# Patient Record
Sex: MALE | Race: WHITE | Employment: FULL TIME | URBAN - NONMETROPOLITAN AREA
[De-identification: names, ages, dates, MRNs, and addresses within clinical notes are randomized per-mention and may not be internally consistent; named-entity substitution may affect disease eponyms.]

---

## 2022-08-15 ENCOUNTER — OFFICE VISIT (OUTPATIENT)
Dept: OPTOMETRY | Age: 27
End: 2022-08-15
Payer: COMMERCIAL

## 2022-08-15 ENCOUNTER — OFFICE VISIT (OUTPATIENT)
Dept: PRIMARY CARE CLINIC | Age: 27
End: 2022-08-15
Payer: COMMERCIAL

## 2022-08-15 VITALS
HEART RATE: 71 BPM | WEIGHT: 202 LBS | SYSTOLIC BLOOD PRESSURE: 126 MMHG | OXYGEN SATURATION: 97 % | DIASTOLIC BLOOD PRESSURE: 88 MMHG | BODY MASS INDEX: 25.93 KG/M2 | TEMPERATURE: 97.4 F | HEIGHT: 74 IN | RESPIRATION RATE: 18 BRPM

## 2022-08-15 DIAGNOSIS — S05.01XA ABRASION OF RIGHT CORNEA, INITIAL ENCOUNTER: Primary | ICD-10-CM

## 2022-08-15 PROCEDURE — 99213 OFFICE O/P EST LOW 20 MIN: CPT | Performed by: NURSE PRACTITIONER

## 2022-08-15 PROCEDURE — 92071 CONTACT LENS FITTING FOR TX: CPT | Performed by: OPTOMETRIST

## 2022-08-15 PROCEDURE — 99204 OFFICE O/P NEW MOD 45 MIN: CPT | Performed by: OPTOMETRIST

## 2022-08-15 RX ORDER — TOBRAMYCIN 3 MG/ML
1 SOLUTION/ DROPS OPHTHALMIC
Qty: 1 EACH | Refills: 0 | Status: SHIPPED | OUTPATIENT
Start: 2022-08-15 | End: 2022-08-22

## 2022-08-15 SDOH — ECONOMIC STABILITY: FOOD INSECURITY: WITHIN THE PAST 12 MONTHS, YOU WORRIED THAT YOUR FOOD WOULD RUN OUT BEFORE YOU GOT MONEY TO BUY MORE.: NEVER TRUE

## 2022-08-15 SDOH — ECONOMIC STABILITY: FOOD INSECURITY: WITHIN THE PAST 12 MONTHS, THE FOOD YOU BOUGHT JUST DIDN'T LAST AND YOU DIDN'T HAVE MONEY TO GET MORE.: NEVER TRUE

## 2022-08-15 ASSESSMENT — VISUAL ACUITY
METHOD: SNELLEN - LINEAR
OS_CC: 20/40
CORRECTION_TYPE: GLASSES
OU: 1

## 2022-08-15 ASSESSMENT — PATIENT HEALTH QUESTIONNAIRE - PHQ9
SUM OF ALL RESPONSES TO PHQ QUESTIONS 1-9: 0
SUM OF ALL RESPONSES TO PHQ QUESTIONS 1-9: 0
SUM OF ALL RESPONSES TO PHQ9 QUESTIONS 1 & 2: 0
2. FEELING DOWN, DEPRESSED OR HOPELESS: 0
1. LITTLE INTEREST OR PLEASURE IN DOING THINGS: 0
SUM OF ALL RESPONSES TO PHQ QUESTIONS 1-9: 0
SUM OF ALL RESPONSES TO PHQ QUESTIONS 1-9: 0

## 2022-08-15 ASSESSMENT — ENCOUNTER SYMPTOMS
RESPIRATORY NEGATIVE: 1
NAUSEA: 0
DOUBLE VISION: 0
EYE REDNESS: 1
EYE DISCHARGE: 0
EYE PAIN: 1
BLURRED VISION: 0
VOMITING: 0
EYE ITCHING: 0
FOREIGN BODY SENSATION: 1
PHOTOPHOBIA: 1

## 2022-08-15 ASSESSMENT — SOCIAL DETERMINANTS OF HEALTH (SDOH): HOW HARD IS IT FOR YOU TO PAY FOR THE VERY BASICS LIKE FOOD, HOUSING, MEDICAL CARE, AND HEATING?: NOT HARD AT ALL

## 2022-08-15 ASSESSMENT — TONOMETRY: IOP_METHOD: NON-CONTACT AIR PUFF

## 2022-08-15 NOTE — PROGRESS NOTES
[facility]             [address], [address]                        Telephone [phone]             Fax [phone]  1995  YCI:[id number]   Date of visit:  8/15/2022    Subjective:    Stanislav Burnett is a 32 y.o.  male who presents to [facility] today (8/15/2022) for evaluation of:    Chief Complaint   Patient presents with    Eye Injury     Today 8/15/2022- Around 9 a.m.- Pt. Was working tearing out a furnace, using a dionisio hammer, and some debris got into the patient's right eye.- Can \"barely see out of eye\"- can barely open eye.- eye is red. - (Worker's Comp) - [employer]       Eye Problem   The right eye is affected. This is a new problem. The current episode started today. The problem occurs constantly. The problem has been unchanged. Injury mechanism: using a dionisio hammer and felt debris in right eye; he was wearing safety glasses at the time. The pain is at a severity of 10/10. There is No known exposure to pink eye. He Does not wear contacts. Associated symptoms include eye redness, a foreign body sensation and photophobia. Pertinent negatives include no blurred vision, eye discharge, double vision, fever, itching, nausea, recent URI or vomiting. He has tried commercial eye wash for the symptoms. The treatment provided no relief. He has the following problem list:  There is no problem list on file for this patient. Current medications are:  Current Outpatient Medications   Medication Sig Dispense Refill    tobramycin (TOBREX) 0.3 % ophthalmic solution Place 1 drop into the right eye every 4 hours (while awake) for 7 days 1 each 0     No current facility-administered medications for this visit. He has No Known Allergies. Mimi Murillo He  reports that he has never smoked.  He has never used smokeless tobacco.      Objective:    Vitals:    08/15/22 1227 BP: 126/88   Site: Left Upper Arm   Position: Sitting   Cuff Size: Medium Adult   Pulse: 71   Resp: 18   Temp: 97.4 °F (36.3 °C)   TempSrc: Tympanic   SpO2: 97%   Weight: 202 lb (91.6 kg)   Height: 6' 2\" (1.88 m)     Body mass index is 25.94 kg/m². Review of Systems   Constitutional: Negative. Negative for fever. Eyes:  Positive for photophobia, pain and redness. Negative for blurred vision, double vision, discharge, itching and visual disturbance. Respiratory: Negative. Cardiovascular: Negative. Gastrointestinal:  Negative for nausea and vomiting. Physical Exam  Vitals and nursing note reviewed. Constitutional:       Appearance: Normal appearance. He is well-developed. HENT:      Head: Normocephalic. Jaw: There is normal jaw occlusion. Mouth/Throat:      Lips: Pink. Mouth: Mucous membranes are moist.      Pharynx: Oropharynx is clear. Uvula midline. Eyes:      General: Lids are normal. Lids are everted, no foreign bodies appreciated. Vision grossly intact. Gaze aligned appropriately. Right eye: No foreign body or discharge. Extraocular Movements: Extraocular movements intact. Conjunctiva/sclera:      Right eye: Right conjunctiva is injected. Pupils: Pupils are equal, round, and reactive to light. Right eye: Corneal abrasion and fluorescein uptake present. Cardiovascular:      Rate and Rhythm: Normal rate and regular rhythm. Heart sounds: Normal heart sounds. Pulmonary:      Effort: Pulmonary effort is normal.      Breath sounds: Normal breath sounds and air entry. Musculoskeletal:      Cervical back: Normal range of motion and neck supple. Skin:     General: Skin is warm and dry. Neurological:      General: No focal deficit present. Mental Status: He is alert and oriented to person, place, and time. Psychiatric:         Behavior: Behavior normal.         Thought Content:  Thought content normal.       Assessment and Plan:    No results found for this visit on 08/15/22. Diagnosis Orders   1. Abrasion of right cornea, initial encounter  tobramycin (TOBREX) 0.3 % ophthalmic solution          Use antibiotic opthalmic drop as prescribed. Good hand hygiene. Warm wash cloth to affected eye to remove drainage. Follow up with  optometrist in 1-2 days. The use, risks, benefits, and side effects of prescribed or recommended medications were discussed. All questions were answered and the patient/caregiver voiced understanding. No orders of the defined types were placed in this encounter.         Electronically signed by ELKIN Blount CNP on 8/15/22 at 12:56 PM EDT

## 2022-08-15 NOTE — PROGRESS NOTES
Melissa Dodd presents today for   Chief Complaint   Patient presents with    Foreign Body in Eye    Eye Pain   . HPI       Eye Pain              Laterality: In right eye    Quality: foreign body sensation and irritation    Pain scale: 10/10    Frequency: constantly    Timing: throughout the day    Duration: 1 day    Course: gradually worsening    Associated symptoms: blurred vision, foreign body sensation, redness, photophobia and tearing    Treatments tried: eye drops    Response to treatment: no improvement              Comments    Referral from [facility] - Metal in the right eye/ abrasion   Today around 9am he was blowing/cleaning out a furnaces and metal blew up into his face. They attempted eye wash rinse with no improvement. He is unable to open the right eye to check visual acuity or IOP. Last vision exam: 3 years, full time glasses wearer, no contacts. Current Outpatient Medications   Medication Sig Dispense Refill    tobramycin (TOBREX) 0.3 % ophthalmic solution Place 1 drop into the right eye every 4 hours (while awake) for 7 days 1 each 0     No current facility-administered medications for this visit.          Family History   Problem Relation Age of Onset    Asthma Mother     Allergies Mother     Other Mother         seizures     Social History     Socioeconomic History    Marital status: Single     Spouse name: None    Number of children: None    Years of education: None    Highest education level: None   Tobacco Use    Smoking status: Never    Smokeless tobacco: Never   Substance and Sexual Activity    Alcohol use: No    Drug use: No     Social Determinants of Health     Financial Resource Strain: Low Risk     Difficulty of Paying Living Expenses: Not hard at all   Food Insecurity: No Food Insecurity    Worried About Running Out of Food in the Last Year: Never true    Ran Out of Food in the Last Year: Never true     Past Medical History:   Diagnosis Date GPSDMYTW(258.3)            Main Ophthalmology Exam       External Exam         Right Left    External Normal               Slit Lamp Exam         Right Left    Lids/Lashes Normal; inversion revealed no FB      Conjunctiva/Sclera 2+ Injection     Cornea central abrasion      Anterior Chamber difficult view ; no cells noted      Iris Round and reactive                    <div id=\"MAIN_EXAM_REVIEWED\"></div>      Tonometry       Tonometry (Non-contact air puff)    Unable to open right eye to get reading                           Visual Acuity (Snellen - Linear)         Right Left    Dist cc too blurry/watery - cant open  20/40      Correction: Glasses            Pupils       Pupils         Pupils    Right PERRL    Left PERRL                      Proparacaine 2drops instilled in the right eye  Fluroscein strip revealed large central abrasion (epithelial depth)  Inversion of the lid with sterile cotton swab showed no foreign body  The eye was rinsed with eye was to wash out any missed debris  A bandage contact lens was applied and then 3 drops of polytrim ophthalmic suspension was applied to the right eye  The patient was allowed to acclimate to the lens and let the anesthetic wear off. Visual acuity in the right eye was then 20/60 with glasses in place  The patient left with instructions to not rub the eye and to use the tobramycin opth drops precribed every 4 hours as prescribed. (Need to get the drops on the way out and instill 4 more drops today before bed and be seen in the office tomorrow)              No orders of the defined types were placed in this encounter. IMPRESSION:  1. Abrasion of right cornea, initial encounter [S05. 01XA (ICD-10-CM)]        PLAN:    Bandage lens applied;  use the tobramycin drops prescribed q4 hours   Return in 1 day;  check for re-epith, visual acuity; comfort, may cylcoplege       There are no Patient Instructions on file for this visit.    Return in about 1 day (around 8/16/2022) for corneal abrasion and bandage contact lens right eye .

## 2022-08-16 ENCOUNTER — OFFICE VISIT (OUTPATIENT)
Dept: OPTOMETRY | Age: 27
End: 2022-08-16
Payer: COMMERCIAL

## 2022-08-16 DIAGNOSIS — S05.01XD ABRASION OF RIGHT CORNEA, SUBSEQUENT ENCOUNTER: Primary | ICD-10-CM

## 2022-08-16 PROCEDURE — 99214 OFFICE O/P EST MOD 30 MIN: CPT | Performed by: OPTOMETRIST

## 2022-08-16 ASSESSMENT — VISUAL ACUITY
CORRECTION_TYPE: GLASSES
METHOD: SNELLEN - LINEAR
OS_CC: 20/20

## 2022-08-16 NOTE — PROGRESS NOTES
Niharika Valderrama presents today for   Chief Complaint   Patient presents with    Follow-up    Eye Injury   . HPI       Eye Injury              Laterality: right eye    Type of trauma: foreign body    Duration: 1 day    Associated signs and symptoms: eye pain, blurred vision, redness, photophobia, tearing and eye discharge    Pain scale: 7/10    Course: stable              Comments    1 day follow up for right eye, corneal abrasion  Tobradex was picked up today and used once so far. He did not get home yesterday evening until after pharmacy closed. He feels eye is a little better today but still very irritated and sensitive to light. CL bandage stayed in well over night, no issues. Right eye was mattery when waking up this morning. Has only used the drop today because didn't pick it up last night ;                     Current Outpatient Medications   Medication Sig Dispense Refill    tobramycin (TOBREX) 0.3 % ophthalmic solution Place 1 drop into the right eye every 4 hours (while awake) for 7 days 1 each 0     No current facility-administered medications for this visit.          Family History   Problem Relation Age of Onset    Asthma Mother     Allergies Mother     Other Mother         seizures     Social History     Socioeconomic History    Marital status: Single     Spouse name: None    Number of children: None    Years of education: None    Highest education level: None   Tobacco Use    Smoking status: Never    Smokeless tobacco: Never   Substance and Sexual Activity    Alcohol use: No    Drug use: No     Social Determinants of Health     Financial Resource Strain: Low Risk     Difficulty of Paying Living Expenses: Not hard at all   Food Insecurity: No Food Insecurity    Worried About Running Out of Food in the Last Year: Never true    Ran Out of Food in the Last Year: Never true     Past Medical History:   Diagnosis Date    Headache(784.0)            Main Ophthalmology Exam       Slit Lamp Exam Right Left    Lids/Lashes no edema;  eye wants to stay closed      Conjunctiva/Sclera 1+ injection -2      Cornea bandage lens in place;  staining surface but large area      Anterior Chamber clear      Iris clear                     <div id=\"MAIN_EXAM_REVIEWED\"></div>      Not recorded       Not recorded       Not recorded         Visual Acuity (Snellen - Linear)         Right Left    Dist cc 20/200 20/20      Correction: Glasses            Pupils       Pupils         Pupils    Right PERRL    Left PERRL                  Neuro/Psych       Neuro/Psych       Oriented x3: Yes    Mood/Affect: Normal                  Not recorded           Not recorded       Not recorded              No orders of the defined types were placed in this encounter. Instilled 2 drops of proparacaine to be able to examine the eye and open better for evaluation   Bandage lens in place  Fluoroscein strip applied shows staining to be superficial but a large area  A/c appears clear  Install 2 drops of cyclo 2% for cyclopleg affect for pain   Also recommend using ibuprofen or tylenol for pain     (Looking at the bottle, it was unopened when Meri Jewels used it to apply a drop to the patients eye while in the office) (therefore the patient had not used any drops since the initial visit yesterday)  Importance was stressed on using the drop every 4 hours until seen back in 2 days and to call if anything worsens. Or if lens comes out or out of place   IMPRESSION:  1. Abrasion of right cornea, subsequent encounter        PLAN:    Use tobra drops 4x per day and artificial tears in between;  we will leave the lens in place and see back on Tues   Leave the bandage in place    There are no Patient Instructions on file for this visit. Return in about 2 days (around 8/18/2022) for corneal abrasion in the right eye ; work slip for off thru thursday .

## 2022-08-16 NOTE — LETTER
[address] [facility] A department of [facility]  Phone: [phone]  Fax: [phone]    Clau Huggins        August 16, 2022     Patient: Quang Brasher   YOB: 1995   Date of Visit: 8/16/2022       To Whom it May Concern:    Palomo Mccormick was seen in my clinic on 8/16/2022. Please excuse him from work 8/16/2022 - 8/18/2022. He may return to work depending on follow up appointment scheduled for 8/18/2022. If you have any questions or concerns, please don't hesitate to call.     Sincerely,         Diana Hankins, OD

## 2022-08-17 ENCOUNTER — TELEPHONE (OUTPATIENT)
Dept: PRIMARY CARE CLINIC | Age: 27
End: 2022-08-17

## 2022-08-17 NOTE — TELEPHONE ENCOUNTER
Copy of work note from Dr. Kulwant Saldivar faxed to employer at their request from Workers COMP visit on 8/16/22 Fax number [phone].

## 2022-08-18 ENCOUNTER — OFFICE VISIT (OUTPATIENT)
Dept: OPTOMETRY | Age: 27
End: 2022-08-18
Payer: COMMERCIAL

## 2022-08-18 DIAGNOSIS — S05.01XD ABRASION OF RIGHT CORNEA, SUBSEQUENT ENCOUNTER: Primary | ICD-10-CM

## 2022-08-18 PROCEDURE — 99211 OFF/OP EST MAY X REQ PHY/QHP: CPT

## 2022-08-18 PROCEDURE — 99214 OFFICE O/P EST MOD 30 MIN: CPT | Performed by: OPTOMETRIST

## 2022-08-18 ASSESSMENT — SLIT LAMP EXAM - LIDS: COMMENTS: NORMAL

## 2022-08-18 ASSESSMENT — VISUAL ACUITY
CORRECTION_TYPE: GLASSES
METHOD: SNELLEN - LINEAR
OD_CC+: -1
OS_CC: 20/20

## 2022-08-18 NOTE — PATIENT INSTRUCTIONS
Continue tobrex drops 3x per day for 1 more week;  return if symptoms worsen or fail to improve.  May also use artificial tears if dry or irritated   Recommend biotrue hydration boost preservative free for another month 2x per day at minimum

## 2022-08-18 NOTE — PROGRESS NOTES
Ricardo Avitia presents today for   Chief Complaint   Patient presents with    Follow-up   . HPI    2 day follow up right eye, corneal abrasion   Tobradex OD every 4 hours  CL bandage doing well, eye overall is doing somewhat better. Vision is cloudy and eye is still painful and light sensitive but improved. Eye feels very dry, especially overnight   Last drop used about 3 hours ago         Current Outpatient Medications   Medication Sig Dispense Refill    tobramycin (TOBREX) 0.3 % ophthalmic solution Place 1 drop into the right eye every 4 hours (while awake) for 7 days 1 each 0     No current facility-administered medications for this visit.          Family History   Problem Relation Age of Onset    Asthma Mother     Allergies Mother     Other Mother         seizures     Social History     Socioeconomic History    Marital status: Single     Spouse name: None    Number of children: None    Years of education: None    Highest education level: None   Tobacco Use    Smoking status: Never    Smokeless tobacco: Never   Substance and Sexual Activity    Alcohol use: No    Drug use: No     Social Determinants of Health     Financial Resource Strain: Low Risk     Difficulty of Paying Living Expenses: Not hard at all   Food Insecurity: No Food Insecurity    Worried About Running Out of Food in the Last Year: Never true    Ran Out of Food in the Last Year: Never true     Past Medical History:   Diagnosis Date    Headache(784.0)            Main Ophthalmology Exam       External Exam         Right Left    External Normal               Slit Lamp Exam         Right Left    Lids/Lashes Normal     Conjunctiva/Sclera Trace Injection     Cornea no staining ;  abrasion healed over      Anterior Chamber Deep and quiet                    <div id=\"MAIN_EXAM_REVIEWED\"></div>      Not recorded       Not recorded       Not recorded         Visual Acuity (Snellen - Linear)         Right Left    Dist cc 20/40 -1 20/20      Correction: Glasses            Not recorded       Not recorded       Not recorded           Not recorded       Not recorded              No orders of the defined types were placed in this encounter. Stained with flourescein strip with bandage contact lens still in place  No staining was seen  Eye was bathed in polytrim 2 drops and saline solution; Then the contact lenses was removed without incident  The eye was stained again revealing no staining. Patient was comfortable  although the eye looked slightly dilated from previous cyclo drops         IMPRESSION:  1. Abrasion of right cornea, subsequent encounter        PLAN:    Continue tobrex drops 3x per day for 1 more week;  return if symptoms worsen or fail to improve. May also use artificial tears if dry or irritated   Recommend biotrue hydration boost preservative free for another month 2x per day at minimum       Patient Instructions   Continue tobrex drops 3x per day for 1 more week;  return if symptoms worsen or fail to improve. May also use artificial tears if dry or irritated   Recommend biotrue hydration boost preservative free for another month 2x per day at minimum    Return in about 1 week (around 8/25/2022) for abrasion right eye f/u .